# Patient Record
(demographics unavailable — no encounter records)

---

## 2025-05-23 NOTE — ADDENDUM
[FreeTextEntry1] : Naren Posadas assisted in documentation on May 23 2025 acting as a scribe for Dr. Miguel Thomas.

## 2025-05-23 NOTE — HISTORY OF PRESENT ILLNESS
[FreeTextEntry1] : 25 JB DUONG is 66 year she with prior h/o   ( -) HTN  ( -) AODM  ( -) smoker  ( +) lipids (- ) obesity ( -) fam hx of CAD  Subjective: - Summary : 66-year-old female presenting for cholesterol evaluation. Patient reports history of polymyalgia rheumatica (PMR) treated with prednisone for the past year, currently tapering down. Patient is active, swims twice a week, walks to work, and watches her diet. No reported chest pain or limitations. - Chief Complaint (CC) : Elevated cholesterol levels - History of Present Illness (HPI) : Patient presents for evaluation of elevated cholesterol levels. She reports being concerned about her cholesterol and has been watching her diet. Patient states she is reluctant to start medication for cholesterol management. She has been on prednisone for PMR for over a year, which may be affecting her cholesterol levels. Patient denies any chest pain or limitations in her daily activities. 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 Patient reports some maternal uncles  young, possibly from heart disease, but details are unclear. No known heart disease in siblings. - Social History : Non-smoker. Active lifestyle including swimming twice a week and walking to work. Diet-conscious. - Review of Systems : Cardiovascular Denies chest pain or limitations. Endocrine Reports borderline diabetes, possibly related to prednisone use. - Medications : Prednisone 1mg daily (tapering down from 16mg over the past year) - Allergies : Objective: - Diagnostic Results : LDL cholesterol 150 mg/dL (elevated), HDL cholesterol 80 mg/dL (good) - Vital Signs : - Physical Examination (PE) : Assessment: - Summary : 66-year-old female with elevated LDL cholesterol (150 mg/dL) and good HDL cholesterol (80 mg/dL). Patient has a history of PMR treated with prednisone, which may be contributing to cholesterol elevation. Patient is active and diet-conscious but concerned about cholesterol levels. No clear family history of heart disease. Patient is reluctant to start cholesterol-lowering medication. 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 We will also consider further testing to better assess her cardiovascular risk. - Plan : - Encourage continuation of current exercise regimen (swimming twice weekly, walking)  - Provide dietary counseling for cholesterol management  - Consider ordering a lipid particle size test for more detailed cholesterol analysis  - Discuss potential for Coronary Artery Calcium (CAC) score to better assess cardiovascular risk  - Continue to monitor cholesterol levels, particularly as patient tapers off prednisone  - Follow up in 3 months to reassess cholesterol levels and discuss test results if performed  - If cholesterol remains elevated at follow-up, discuss potential for starting statin therapy  - Monitor blood glucose levels due to borderline diabetes status  - Educate patient on signs and symptoms of cardiovascular disease to report

## 2025-05-23 NOTE — PHYSICAL EXAM
[Precordial Heave Left] : a precordial heave was noted at the left lower parasternal line [Normal S1] : normal S1 [Normal S2] : normal S2 [Click] : no click [Distant] : the heart sounds were ~L not distant [III] : a grade 3 [I] : a grade 1 [Right Carotid Bruit] : no bruit heard over the right carotid [Left Carotid Bruit] : no bruit heard over the left carotid [Normal] : normal gait

## 2025-05-23 NOTE — DISCUSSION/SUMMARY
[FreeTextEntry1] :  Plan: - Summary : Given the patient's concerns about medication and her active lifestyle, we will focus on non-pharmacological interventions initially. We will also consider further testing to better assess her cardiovascular risk. - Plan : - Encourage continuation of current exercise regimen (swimming twice weekly, walking)  - Provide dietary counseling for cholesterol management  - Consider ordering a lipid particle size test for more detailed cholesterol analysis  - Discuss potential for Coronary Artery Calcium (CAC) score to better assess cardiovascular risk  - Continue to monitor cholesterol levels, particularly as patient tapers off prednisone  - Follow up in 3 months to reassess cholesterol levels and discuss test results if performed  - If cholesterol remains elevated at follow-up, discuss potential for starting statin therapy  - Monitor blood glucose levels due to borderline diabetes status  - Educate patient on signs and symptoms of cardiovascular disease to report Lvef 55% 2+ MR POST JET  1+ AI  natural history of MR explained to pat  will assess need for statin rx after CAC scan done  [EKG obtained to assist in diagnosis and management of assessed problem(s)] : EKG obtained to assist in diagnosis and management of assessed problem(s)